# Patient Record
Sex: MALE | Race: WHITE | Employment: OTHER
[De-identification: names, ages, dates, MRNs, and addresses within clinical notes are randomized per-mention and may not be internally consistent; named-entity substitution may affect disease eponyms.]

---

## 2018-10-15 ENCOUNTER — HOSPITAL ENCOUNTER (OUTPATIENT)
Dept: GENERAL RADIOLOGY | Facility: CLINIC | Age: 83
Discharge: HOME | End: 2018-10-17
Payer: MEDICARE

## 2018-10-15 DIAGNOSIS — R04.2 HEMOPTYSIS: ICD-10-CM

## 2018-10-15 PROCEDURE — 71046 X-RAY EXAM CHEST 2 VIEWS: CPT

## 2019-01-28 RX ORDER — SODIUM CHLORIDE 9 MG/ML
INJECTION, SOLUTION INTRAVENOUS CONTINUOUS
Status: CANCELLED | OUTPATIENT
Start: 2019-01-28

## 2019-01-29 ENCOUNTER — HOSPITAL ENCOUNTER (OUTPATIENT)
Dept: CT IMAGING | Age: 84
Discharge: HOME | End: 2019-01-31
Payer: MEDICARE

## 2019-01-29 ENCOUNTER — HOSPITAL ENCOUNTER (OUTPATIENT)
Dept: GENERAL RADIOLOGY | Age: 84
Discharge: HOME | End: 2019-01-31
Payer: MEDICARE

## 2019-01-29 VITALS
SYSTOLIC BLOOD PRESSURE: 126 MMHG | HEART RATE: 63 BPM | HEIGHT: 71 IN | BODY MASS INDEX: 25.81 KG/M2 | WEIGHT: 184.4 LBS | TEMPERATURE: 97.2 F | DIASTOLIC BLOOD PRESSURE: 32 MMHG | OXYGEN SATURATION: 93 % | RESPIRATION RATE: 18 BRPM

## 2019-01-29 DIAGNOSIS — R91.8 MASS OF RIGHT LUNG: ICD-10-CM

## 2019-01-29 LAB
INR BLD: 1
PARTIAL THROMBOPLASTIN TIME: 28.5 SEC (ref 23–31)
PLATELET # BLD: 336 K/UL (ref 130–400)
PROTHROMBIN TIME: 10.8 SEC (ref 9.7–11.6)

## 2019-01-29 PROCEDURE — 77012 CT SCAN FOR NEEDLE BIOPSY: CPT

## 2019-01-29 PROCEDURE — 7100000011 HC PHASE II RECOVERY - ADDTL 15 MIN

## 2019-01-29 PROCEDURE — 85730 THROMBOPLASTIN TIME PARTIAL: CPT

## 2019-01-29 PROCEDURE — 7100000010 HC PHASE II RECOVERY - FIRST 15 MIN

## 2019-01-29 PROCEDURE — 32405 CT NEEDLE BIOPSY LUNG PERCUTANEOUS: CPT

## 2019-01-29 PROCEDURE — 88342 IMHCHEM/IMCYTCHM 1ST ANTB: CPT

## 2019-01-29 PROCEDURE — 85049 AUTOMATED PLATELET COUNT: CPT

## 2019-01-29 PROCEDURE — 88305 TISSUE EXAM BY PATHOLOGIST: CPT

## 2019-01-29 PROCEDURE — 88333 PATH CONSLTJ SURG CYTO XM 1: CPT

## 2019-01-29 PROCEDURE — 85610 PROTHROMBIN TIME: CPT

## 2019-01-29 PROCEDURE — 71045 X-RAY EXAM CHEST 1 VIEW: CPT

## 2019-01-29 PROCEDURE — 6360000002 HC RX W HCPCS: Performed by: RADIOLOGY

## 2019-01-29 PROCEDURE — 2580000003 HC RX 258: Performed by: RADIOLOGY

## 2019-01-29 PROCEDURE — 88341 IMHCHEM/IMCYTCHM EA ADD ANTB: CPT

## 2019-01-29 RX ORDER — FENTANYL CITRATE 50 UG/ML
INJECTION, SOLUTION INTRAMUSCULAR; INTRAVENOUS
Status: COMPLETED | OUTPATIENT
Start: 2019-01-29 | End: 2019-01-29

## 2019-01-29 RX ORDER — LISINOPRIL AND HYDROCHLOROTHIAZIDE 20; 12.5 MG/1; MG/1
1 TABLET ORAL DAILY
COMMUNITY

## 2019-01-29 RX ORDER — ACETAMINOPHEN 325 MG/1
650 TABLET ORAL EVERY 4 HOURS PRN
Status: DISCONTINUED | OUTPATIENT
Start: 2019-01-29 | End: 2019-02-01 | Stop reason: HOSPADM

## 2019-01-29 RX ORDER — MIDAZOLAM HYDROCHLORIDE 1 MG/ML
INJECTION INTRAMUSCULAR; INTRAVENOUS
Status: COMPLETED | OUTPATIENT
Start: 2019-01-29 | End: 2019-01-29

## 2019-01-29 RX ORDER — SODIUM CHLORIDE 9 MG/ML
INJECTION, SOLUTION INTRAVENOUS CONTINUOUS
Status: DISCONTINUED | OUTPATIENT
Start: 2019-01-29 | End: 2019-02-01 | Stop reason: HOSPADM

## 2019-01-29 RX ORDER — ATORVASTATIN CALCIUM 20 MG/1
20 TABLET, FILM COATED ORAL DAILY
COMMUNITY

## 2019-01-29 RX ORDER — GLIMEPIRIDE 4 MG/1
4 TABLET ORAL
COMMUNITY

## 2019-01-29 RX ADMIN — MIDAZOLAM 0.5 MG: 1 INJECTION INTRAMUSCULAR; INTRAVENOUS at 11:04

## 2019-01-29 RX ADMIN — FENTANYL CITRATE 50 MCG: 50 INJECTION, SOLUTION INTRAMUSCULAR; INTRAVENOUS at 10:58

## 2019-01-29 RX ADMIN — MIDAZOLAM 1 MG: 1 INJECTION INTRAMUSCULAR; INTRAVENOUS at 10:58

## 2019-01-29 RX ADMIN — SODIUM CHLORIDE: 9 INJECTION, SOLUTION INTRAVENOUS at 09:19

## 2019-01-29 ASSESSMENT — PAIN SCALES - GENERAL
PAINLEVEL_OUTOF10: 0

## 2019-01-29 ASSESSMENT — PAIN - FUNCTIONAL ASSESSMENT: PAIN_FUNCTIONAL_ASSESSMENT: 0-10

## 2019-01-30 LAB — SURGICAL PATHOLOGY REPORT: NORMAL

## 2019-02-04 ENCOUNTER — TELEPHONE (OUTPATIENT)
Dept: ONCOLOGY | Age: 84
End: 2019-02-04

## 2019-02-18 ENCOUNTER — TELEPHONE (OUTPATIENT)
Dept: RADIATION ONCOLOGY | Facility: MEDICAL CENTER | Age: 84
End: 2019-02-18

## 2019-02-21 ENCOUNTER — HOSPITAL ENCOUNTER (OUTPATIENT)
Dept: RADIATION ONCOLOGY | Facility: MEDICAL CENTER | Age: 84
Discharge: HOME | End: 2019-02-21
Payer: MEDICARE

## 2019-02-21 VITALS
RESPIRATION RATE: 14 BRPM | DIASTOLIC BLOOD PRESSURE: 70 MMHG | SYSTOLIC BLOOD PRESSURE: 168 MMHG | WEIGHT: 184 LBS | TEMPERATURE: 97.9 F | HEIGHT: 71 IN | OXYGEN SATURATION: 96 % | BODY MASS INDEX: 25.76 KG/M2 | HEART RATE: 89 BPM

## 2019-02-21 DIAGNOSIS — C34.11 CANCER OF UPPER LOBE OF RIGHT LUNG (HCC): ICD-10-CM

## 2019-02-21 PROCEDURE — 99213 OFFICE O/P EST LOW 20 MIN: CPT | Performed by: RADIOLOGY

## 2019-03-01 ENCOUNTER — HOSPITAL ENCOUNTER (OUTPATIENT)
Dept: RADIATION ONCOLOGY | Facility: MEDICAL CENTER | Age: 84
Discharge: HOME | End: 2019-03-01
Attending: RADIOLOGY
Payer: MEDICARE

## 2019-03-01 PROCEDURE — 77290 THER RAD SIMULAJ FIELD CPLX: CPT | Performed by: RADIOLOGY

## 2019-03-01 PROCEDURE — 77334 RADIATION TREATMENT AID(S): CPT | Performed by: RADIOLOGY

## 2019-03-01 PROCEDURE — 77470 SPECIAL RADIATION TREATMENT: CPT | Performed by: RADIOLOGY

## 2019-03-05 ENCOUNTER — HOSPITAL ENCOUNTER (OUTPATIENT)
Dept: RADIATION ONCOLOGY | Facility: MEDICAL CENTER | Age: 84
Discharge: HOME | End: 2019-03-05
Attending: RADIOLOGY
Payer: MEDICARE

## 2019-03-05 ENCOUNTER — TELEPHONE (OUTPATIENT)
Dept: ONCOLOGY | Age: 84
End: 2019-03-05

## 2019-03-05 PROCEDURE — 77295 3-D RADIOTHERAPY PLAN: CPT | Performed by: RADIOLOGY

## 2019-03-05 PROCEDURE — 77334 RADIATION TREATMENT AID(S): CPT | Performed by: RADIOLOGY

## 2019-03-05 PROCEDURE — 77300 RADIATION THERAPY DOSE PLAN: CPT | Performed by: RADIOLOGY

## 2019-03-13 ENCOUNTER — HOSPITAL ENCOUNTER (OUTPATIENT)
Dept: RADIATION ONCOLOGY | Facility: MEDICAL CENTER | Age: 84
Discharge: HOME | End: 2019-03-13
Attending: RADIOLOGY
Payer: MEDICARE

## 2019-03-13 PROCEDURE — 77387 GUIDANCE FOR RADJ TX DLVR: CPT | Performed by: RADIOLOGY

## 2019-03-13 PROCEDURE — 77280 THER RAD SIMULAJ FIELD SMPL: CPT | Performed by: RADIOLOGY

## 2019-03-13 PROCEDURE — 77412 RADIATION TX DELIVERY LVL 3: CPT | Performed by: RADIOLOGY

## 2019-03-14 ENCOUNTER — HOSPITAL ENCOUNTER (OUTPATIENT)
Dept: RADIATION ONCOLOGY | Facility: MEDICAL CENTER | Age: 84
Discharge: HOME | End: 2019-03-14
Attending: RADIOLOGY
Payer: MEDICARE

## 2019-03-14 PROCEDURE — 77387 GUIDANCE FOR RADJ TX DLVR: CPT | Performed by: RADIOLOGY

## 2019-03-14 PROCEDURE — 77412 RADIATION TX DELIVERY LVL 3: CPT | Performed by: RADIOLOGY

## 2019-03-15 ENCOUNTER — HOSPITAL ENCOUNTER (OUTPATIENT)
Dept: RADIATION ONCOLOGY | Facility: MEDICAL CENTER | Age: 84
Discharge: HOME | End: 2019-03-15
Attending: RADIOLOGY
Payer: MEDICARE

## 2019-03-15 LAB
BASOPHILS ABSOLUTE: NORMAL /ΜL
BASOPHILS RELATIVE PERCENT: NORMAL %
EOSINOPHILS ABSOLUTE: NORMAL /ΜL
EOSINOPHILS RELATIVE PERCENT: NORMAL %
HCT VFR BLD CALC: 42 % (ref 41–53)
HEMOGLOBIN: 14 G/DL (ref 13.5–17.5)
LYMPHOCYTES ABSOLUTE: NORMAL /ΜL
LYMPHOCYTES RELATIVE PERCENT: NORMAL %
MCH RBC QN AUTO: NORMAL PG
MCHC RBC AUTO-ENTMCNC: NORMAL G/DL
MCV RBC AUTO: NORMAL FL
MONOCYTES ABSOLUTE: NORMAL /ΜL
MONOCYTES RELATIVE PERCENT: NORMAL %
NEUTROPHILS ABSOLUTE: NORMAL /ΜL
NEUTROPHILS RELATIVE PERCENT: NORMAL %
PLATELET # BLD: 250 K/ΜL
PMV BLD AUTO: NORMAL FL
RBC # BLD: 4.37 10^6/ΜL
WBC # BLD: 7.6 10^3/ML

## 2019-03-15 PROCEDURE — 77412 RADIATION TX DELIVERY LVL 3: CPT | Performed by: RADIOLOGY

## 2019-03-15 PROCEDURE — 77387 GUIDANCE FOR RADJ TX DLVR: CPT | Performed by: RADIOLOGY

## 2019-03-18 ENCOUNTER — HOSPITAL ENCOUNTER (OUTPATIENT)
Dept: RADIATION ONCOLOGY | Facility: MEDICAL CENTER | Age: 84
Discharge: HOME | End: 2019-03-18
Attending: RADIOLOGY
Payer: MEDICARE

## 2019-03-18 VITALS
SYSTOLIC BLOOD PRESSURE: 169 MMHG | TEMPERATURE: 97.2 F | BODY MASS INDEX: 26.26 KG/M2 | OXYGEN SATURATION: 98 % | DIASTOLIC BLOOD PRESSURE: 73 MMHG | RESPIRATION RATE: 14 BRPM | HEART RATE: 90 BPM | WEIGHT: 188.25 LBS

## 2019-03-18 DIAGNOSIS — C34.11 CANCER OF UPPER LOBE OF RIGHT LUNG (HCC): Primary | ICD-10-CM

## 2019-03-18 PROCEDURE — 77412 RADIATION TX DELIVERY LVL 3: CPT | Performed by: RADIOLOGY

## 2019-03-18 PROCEDURE — 77417 THER RADIOLOGY PORT IMAGE(S): CPT | Performed by: RADIOLOGY

## 2019-03-18 PROCEDURE — 77387 GUIDANCE FOR RADJ TX DLVR: CPT | Performed by: RADIOLOGY

## 2019-03-19 ENCOUNTER — HOSPITAL ENCOUNTER (OUTPATIENT)
Dept: RADIATION ONCOLOGY | Facility: MEDICAL CENTER | Age: 84
Discharge: HOME | End: 2019-03-19
Attending: RADIOLOGY
Payer: MEDICARE

## 2019-03-19 PROCEDURE — 77387 GUIDANCE FOR RADJ TX DLVR: CPT | Performed by: RADIOLOGY

## 2019-03-19 PROCEDURE — 77412 RADIATION TX DELIVERY LVL 3: CPT | Performed by: RADIOLOGY

## 2019-03-19 PROCEDURE — 77336 RADIATION PHYSICS CONSULT: CPT | Performed by: RADIOLOGY

## 2019-03-20 ENCOUNTER — HOSPITAL ENCOUNTER (OUTPATIENT)
Dept: RADIATION ONCOLOGY | Facility: MEDICAL CENTER | Age: 84
Discharge: HOME | End: 2019-03-20
Attending: RADIOLOGY
Payer: MEDICARE

## 2019-03-20 PROCEDURE — 77387 GUIDANCE FOR RADJ TX DLVR: CPT | Performed by: RADIOLOGY

## 2019-03-20 PROCEDURE — 77417 THER RADIOLOGY PORT IMAGE(S): CPT | Performed by: RADIOLOGY

## 2019-03-20 PROCEDURE — 77412 RADIATION TX DELIVERY LVL 3: CPT | Performed by: RADIOLOGY

## 2019-03-21 ENCOUNTER — HOSPITAL ENCOUNTER (OUTPATIENT)
Dept: RADIATION ONCOLOGY | Facility: MEDICAL CENTER | Age: 84
Discharge: HOME | End: 2019-03-21
Attending: RADIOLOGY
Payer: MEDICARE

## 2019-03-21 PROCEDURE — 77412 RADIATION TX DELIVERY LVL 3: CPT | Performed by: RADIOLOGY

## 2019-03-21 PROCEDURE — 77387 GUIDANCE FOR RADJ TX DLVR: CPT | Performed by: RADIOLOGY

## 2019-03-22 ENCOUNTER — HOSPITAL ENCOUNTER (OUTPATIENT)
Dept: RADIATION ONCOLOGY | Facility: MEDICAL CENTER | Age: 84
Discharge: HOME | End: 2019-03-22
Attending: RADIOLOGY
Payer: MEDICARE

## 2019-03-22 PROCEDURE — 77387 GUIDANCE FOR RADJ TX DLVR: CPT | Performed by: RADIOLOGY

## 2019-03-22 PROCEDURE — 77412 RADIATION TX DELIVERY LVL 3: CPT | Performed by: RADIOLOGY

## 2019-03-25 ENCOUNTER — HOSPITAL ENCOUNTER (OUTPATIENT)
Dept: RADIATION ONCOLOGY | Facility: MEDICAL CENTER | Age: 84
Discharge: HOME | End: 2019-03-25
Attending: RADIOLOGY
Payer: MEDICARE

## 2019-03-25 VITALS
SYSTOLIC BLOOD PRESSURE: 168 MMHG | HEART RATE: 99 BPM | DIASTOLIC BLOOD PRESSURE: 72 MMHG | TEMPERATURE: 97.7 F | BODY MASS INDEX: 25.99 KG/M2 | RESPIRATION RATE: 16 BRPM | WEIGHT: 186.38 LBS | OXYGEN SATURATION: 98 %

## 2019-03-25 PROCEDURE — 77417 THER RADIOLOGY PORT IMAGE(S): CPT | Performed by: RADIOLOGY

## 2019-03-25 PROCEDURE — 77387 GUIDANCE FOR RADJ TX DLVR: CPT | Performed by: RADIOLOGY

## 2019-03-25 PROCEDURE — 77412 RADIATION TX DELIVERY LVL 3: CPT | Performed by: RADIOLOGY

## 2019-03-26 ENCOUNTER — HOSPITAL ENCOUNTER (OUTPATIENT)
Dept: RADIATION ONCOLOGY | Facility: MEDICAL CENTER | Age: 84
Discharge: HOME | End: 2019-03-26
Attending: RADIOLOGY
Payer: MEDICARE

## 2019-03-26 PROCEDURE — 77412 RADIATION TX DELIVERY LVL 3: CPT | Performed by: RADIOLOGY

## 2019-03-26 PROCEDURE — 77336 RADIATION PHYSICS CONSULT: CPT | Performed by: RADIOLOGY

## 2019-03-26 PROCEDURE — 77387 GUIDANCE FOR RADJ TX DLVR: CPT | Performed by: RADIOLOGY

## 2019-03-27 ENCOUNTER — HOSPITAL ENCOUNTER (OUTPATIENT)
Dept: RADIATION ONCOLOGY | Facility: MEDICAL CENTER | Age: 84
Discharge: HOME | End: 2019-03-27
Attending: RADIOLOGY
Payer: MEDICARE

## 2019-03-27 PROCEDURE — 77417 THER RADIOLOGY PORT IMAGE(S): CPT | Performed by: RADIOLOGY

## 2019-03-27 PROCEDURE — 77412 RADIATION TX DELIVERY LVL 3: CPT | Performed by: RADIOLOGY

## 2019-03-27 PROCEDURE — 77387 GUIDANCE FOR RADJ TX DLVR: CPT | Performed by: RADIOLOGY

## 2019-03-28 ENCOUNTER — HOSPITAL ENCOUNTER (OUTPATIENT)
Dept: RADIATION ONCOLOGY | Facility: MEDICAL CENTER | Age: 84
Discharge: HOME | End: 2019-03-28
Attending: RADIOLOGY
Payer: MEDICARE

## 2019-03-28 PROCEDURE — 77412 RADIATION TX DELIVERY LVL 3: CPT | Performed by: RADIOLOGY

## 2019-03-28 PROCEDURE — 77387 GUIDANCE FOR RADJ TX DLVR: CPT | Performed by: RADIOLOGY

## 2019-03-29 ENCOUNTER — HOSPITAL ENCOUNTER (OUTPATIENT)
Dept: RADIATION ONCOLOGY | Facility: MEDICAL CENTER | Age: 84
Discharge: HOME | End: 2019-03-29
Attending: RADIOLOGY
Payer: MEDICARE

## 2019-03-29 PROCEDURE — 77387 GUIDANCE FOR RADJ TX DLVR: CPT | Performed by: RADIOLOGY

## 2019-03-29 PROCEDURE — 77412 RADIATION TX DELIVERY LVL 3: CPT | Performed by: RADIOLOGY

## 2019-04-01 ENCOUNTER — HOSPITAL ENCOUNTER (OUTPATIENT)
Dept: RADIATION ONCOLOGY | Facility: MEDICAL CENTER | Age: 84
Discharge: HOME | End: 2019-04-01
Attending: RADIOLOGY
Payer: MEDICARE

## 2019-04-01 VITALS
DIASTOLIC BLOOD PRESSURE: 76 MMHG | RESPIRATION RATE: 16 BRPM | TEMPERATURE: 97.7 F | OXYGEN SATURATION: 100 % | WEIGHT: 188 LBS | SYSTOLIC BLOOD PRESSURE: 140 MMHG | HEART RATE: 101 BPM | BODY MASS INDEX: 26.22 KG/M2

## 2019-04-01 PROCEDURE — 77412 RADIATION TX DELIVERY LVL 3: CPT | Performed by: RADIOLOGY

## 2019-04-01 PROCEDURE — 77387 GUIDANCE FOR RADJ TX DLVR: CPT | Performed by: RADIOLOGY

## 2019-04-01 NOTE — PROGRESS NOTES
Ryania Cory  4/1/2019  Wt Readings from Last 3 Encounters:   04/01/19 188 lb (85.3 kg)   03/25/19 186 lb 6 oz (84.5 kg)   03/18/19 188 lb 4 oz (85.4 kg)     Body mass index is 26.22 kg/m². Treatment Area: Lung    Patient was seen today for weekly visit. Pt denies pain and reports that hes still spitting up blood with his cough. Dr. Lenka Christopher into eval. No changes made.     Comfort Alteration  Fatigue: Mild    Ventilation Alterations  Cough: Yes  Hemoptysis: Yes  Mucus Color: bloody  Dyspnea: No  O2 Sat: 100%    Nutritional Alteration  Anorexia: No  Nausea: No   Vomiting: No     Skin Alteration   Sensation:none    Radiation Dermatitis:  none    Mucous Membrane Alteration  Voice Changes/ Stridor/Larynx: no  Pharynx & Esophagus: none    Elimination Alterations  Constipation: no  Diarrhea:  no      Emotional  Coping: effective      Injury, potential bleeding or infection: none    Other:none    Lab Results   Component Value Date    WBC 7.60 03/15/2019     03/15/2019         BP (!) 140/76   Pulse 101   Temp 97.7 °F (36.5 °C) (Oral)   Resp 16   Wt 188 lb (85.3 kg)   SpO2 100%   BMI 26.22 kg/m²             Armando Trejo
Virus Vaccine 2018    Pneumococcal 13-valent Conjugate ([id number]) 2016       Social History     Tobacco Use   Smoking Status Former Smoker    Last attempt to quit: 1980    Years since quittin.2   Smokeless Tobacco Never Used     Counseling given: Not Answered      PHYSICAL FINDINGS:    CHAPERONE: Declined    ECO Asymptomatic      Vital Signs:  BP (!) 140/76   Pulse 101   Temp 97.7 °F (36.5 °C) (Oral)   Resp 16   Wt 188 lb (85.3 kg)   SpO2 100%   BMI 26.22 kg/m²   Wt Readings from Last 5 Encounters:   19 188 lb (85.3 kg)   19 186 lb 6 oz (84.5 kg)   19 188 lb 4 oz (85.4 kg)   19 184 lb (83.5 kg)   19 184 lb 6.4 oz (83.6 kg)     There is no skin irritation within the treatment fields, lungs are clear to auscultation, heart regular rate and rhythm, good skin turgor present, abdomen soft. ASSESSMENT PLAN:   Tolerating combined modality therapy well, continue as prescribed, [place] for potential toxicities including cytopenia, failure to thrive, mucositis, esophagitis. Electronically signed by Josefa Marinelli MD on 2019 at 10:15 [address] [facility] DrJulian    Drugs Prescribed:  New Prescriptions    No medications on file       Other Orders Placed:  No orders of the defined types were placed in this encounter.

## 2019-04-02 ENCOUNTER — HOSPITAL ENCOUNTER (OUTPATIENT)
Dept: RADIATION ONCOLOGY | Facility: MEDICAL CENTER | Age: 84
Discharge: HOME | End: 2019-04-02
Attending: RADIOLOGY
Payer: MEDICARE

## 2019-04-02 PROCEDURE — 77412 RADIATION TX DELIVERY LVL 3: CPT | Performed by: RADIOLOGY

## 2019-04-02 PROCEDURE — 77387 GUIDANCE FOR RADJ TX DLVR: CPT | Performed by: RADIOLOGY

## 2019-04-02 PROCEDURE — 77336 RADIATION PHYSICS CONSULT: CPT | Performed by: RADIOLOGY

## 2019-04-03 ENCOUNTER — HOSPITAL ENCOUNTER (OUTPATIENT)
Dept: RADIATION ONCOLOGY | Facility: MEDICAL CENTER | Age: 84
Discharge: HOME | End: 2019-04-03
Attending: RADIOLOGY
Payer: MEDICARE

## 2019-04-03 PROCEDURE — 77417 THER RADIOLOGY PORT IMAGE(S): CPT | Performed by: RADIOLOGY

## 2019-04-03 PROCEDURE — 77412 RADIATION TX DELIVERY LVL 3: CPT | Performed by: RADIOLOGY

## 2019-04-04 ENCOUNTER — HOSPITAL ENCOUNTER (OUTPATIENT)
Dept: RADIATION ONCOLOGY | Facility: MEDICAL CENTER | Age: 84
Discharge: HOME | End: 2019-04-04
Attending: RADIOLOGY
Payer: MEDICARE

## 2019-04-04 PROCEDURE — 77412 RADIATION TX DELIVERY LVL 3: CPT | Performed by: RADIOLOGY

## 2019-04-04 PROCEDURE — 77387 GUIDANCE FOR RADJ TX DLVR: CPT | Performed by: RADIOLOGY

## 2019-04-05 ENCOUNTER — APPOINTMENT (OUTPATIENT)
Dept: RADIATION ONCOLOGY | Facility: MEDICAL CENTER | Age: 84
End: 2019-04-05
Attending: RADIOLOGY
Payer: MEDICARE

## 2019-04-05 LAB
BASOPHILS ABSOLUTE: ABNORMAL /ΜL
BASOPHILS RELATIVE PERCENT: ABNORMAL %
EOSINOPHILS ABSOLUTE: ABNORMAL /ΜL
EOSINOPHILS RELATIVE PERCENT: ABNORMAL %
HCT VFR BLD CALC: 37.6 % (ref 41–53)
HEMOGLOBIN: 12.8 G/DL (ref 13.5–17.5)
LYMPHOCYTES ABSOLUTE: ABNORMAL /ΜL
LYMPHOCYTES RELATIVE PERCENT: ABNORMAL %
MCH RBC QN AUTO: ABNORMAL PG
MCHC RBC AUTO-ENTMCNC: ABNORMAL G/DL
MCV RBC AUTO: ABNORMAL FL
MONOCYTES ABSOLUTE: ABNORMAL /ΜL
MONOCYTES RELATIVE PERCENT: ABNORMAL %
NEUTROPHILS ABSOLUTE: ABNORMAL /ΜL
NEUTROPHILS RELATIVE PERCENT: ABNORMAL %
PLATELET # BLD: 246 K/ΜL
PMV BLD AUTO: ABNORMAL FL
RBC # BLD: 4.02 10^6/ΜL
WBC # BLD: 3.97 10^3/ML

## 2019-04-05 PROCEDURE — 77412 RADIATION TX DELIVERY LVL 3: CPT | Performed by: RADIOLOGY

## 2019-04-05 PROCEDURE — 77387 GUIDANCE FOR RADJ TX DLVR: CPT | Performed by: RADIOLOGY

## 2019-04-08 ENCOUNTER — HOSPITAL ENCOUNTER (OUTPATIENT)
Dept: RADIATION ONCOLOGY | Facility: MEDICAL CENTER | Age: 84
Discharge: HOME | End: 2019-04-08
Attending: RADIOLOGY
Payer: MEDICARE

## 2019-04-08 VITALS
TEMPERATURE: 97.3 F | HEART RATE: 94 BPM | OXYGEN SATURATION: 99 % | SYSTOLIC BLOOD PRESSURE: 164 MMHG | DIASTOLIC BLOOD PRESSURE: 84 MMHG | BODY MASS INDEX: 25.87 KG/M2 | WEIGHT: 185.5 LBS | RESPIRATION RATE: 18 BRPM

## 2019-04-08 PROCEDURE — 77412 RADIATION TX DELIVERY LVL 3: CPT | Performed by: RADIOLOGY

## 2019-04-08 PROCEDURE — 77387 GUIDANCE FOR RADJ TX DLVR: CPT | Performed by: RADIOLOGY

## 2019-04-08 NOTE — PROGRESS NOTES
Macario Form  4/8/2019  Wt Readings from Last 3 Encounters:   04/08/19 185 lb 8 oz (84.1 kg)   04/01/19 188 lb (85.3 kg)   03/25/19 186 lb 6 oz (84.5 kg)     Body mass index is 25.87 kg/m². Treatment Area: Lung    Patient was seen today for weekly visit. Pt denies pain. Pt reports chronic blood in sputum when he coughs. Pt states he blew his nose this am and there was blood.  Dr. Marguerite Baltazar into eval.    Comfort Alteration  Fatigue: Mild    Ventilation Alterations  Cough: Yes  Hemoptysis: Yes  Mucus Color: bloody  Dyspnea: No  O2 Sat: 99%    Nutritional Alteration  Anorexia: No  Nausea: No   Vomiting: No     Skin Alteration   Sensation:none    Radiation Dermatitis:  none    Mucous Membrane Alteration  Voice Changes/ Stridor/Larynx: no  Pharynx & Esophagus: none    Elimination Alterations  Constipation: no  Diarrhea:  no      Emotional  Coping: effective      Injury, potential bleeding or infection: none    Other:none    Lab Results   Component Value Date    WBC 7.60 03/15/2019     03/15/2019         BP (!) 164/84   Pulse 94   Temp 97.3 °F (36.3 °C) (Oral)   Resp 18   Wt 185 lb 8 oz (84.1 kg)   SpO2 99%   BMI 25.87 kg/m²             Zane Weir
Immunizations/Smoking Status:  Immunization History   Administered Date(s) Administered    Influenza Virus Vaccine 2018    Pneumococcal 13-valent Conjugate ([id number]) 2016       Social History     Tobacco Use   Smoking Status Former Smoker    Last attempt to quit: 1980    Years since quittin.2   Smokeless Tobacco Never Used     Counseling given: Not Answered      PHYSICAL FINDINGS:    CHAPERONE: Declined    ECO Asymptomatic      Vital Signs:  BP (!) 164/84   Pulse 94   Temp 97.3 °F (36.3 °C) (Oral)   Resp 18   Wt 185 lb 8 oz (84.1 kg)   SpO2 99%   BMI 25.87 kg/m²   Wt Readings from Last 5 Encounters:   19 185 lb 8 oz (84.1 kg)   19 188 lb (85.3 kg)   19 186 lb 6 oz (84.5 kg)   19 188 lb 4 oz (85.4 kg)   19 184 lb (83.5 kg)     Faint erythema outlines the treatment portals on his back, oral mucosa is pink and moist, no visible mucositis or thrush. There is sllight inflammation but no visible infection, dried blood at his right nostril, the left nostril is clear. Lungs are clear to auscultation, heart regular rate and rhythm, good skin turgor is present. Laboratory studies from [facility] clinic are within acceptable range to continue therapy as planned    ASSESSMENT PLAN:   Tolerating combined modality treatment fairly well. We will continue to monitor for the potential acute toxicities including cytopenia, failure to thrive, mucositis. Continue treatment as prescribed. He will use saline nasal spray to moisten the tissue of his nostril and vaseline gently applied by Q-tip. Electronically signed by Martina Costello MD on 2019 at 10:22 [facility]    Drugs Prescribed:  New Prescriptions    No medications on file       Other Orders Placed:  No orders of the defined types were placed in this encounter.

## 2019-04-09 ENCOUNTER — HOSPITAL ENCOUNTER (OUTPATIENT)
Dept: RADIATION ONCOLOGY | Facility: MEDICAL CENTER | Age: 84
Discharge: HOME | End: 2019-04-09
Attending: RADIOLOGY
Payer: MEDICARE

## 2019-04-09 PROCEDURE — 77412 RADIATION TX DELIVERY LVL 3: CPT | Performed by: RADIOLOGY

## 2019-04-09 PROCEDURE — 77336 RADIATION PHYSICS CONSULT: CPT | Performed by: RADIOLOGY

## 2019-04-10 ENCOUNTER — HOSPITAL ENCOUNTER (OUTPATIENT)
Dept: RADIATION ONCOLOGY | Facility: MEDICAL CENTER | Age: 84
Discharge: HOME | End: 2019-04-10
Attending: RADIOLOGY
Payer: MEDICARE

## 2019-04-10 PROCEDURE — 77412 RADIATION TX DELIVERY LVL 3: CPT | Performed by: RADIOLOGY

## 2019-04-10 PROCEDURE — 77417 THER RADIOLOGY PORT IMAGE(S): CPT | Performed by: RADIOLOGY

## 2019-04-11 ENCOUNTER — HOSPITAL ENCOUNTER (OUTPATIENT)
Dept: RADIATION ONCOLOGY | Facility: MEDICAL CENTER | Age: 84
Discharge: HOME | End: 2019-04-11
Attending: RADIOLOGY
Payer: MEDICARE

## 2019-04-11 PROCEDURE — 77412 RADIATION TX DELIVERY LVL 3: CPT | Performed by: RADIOLOGY

## 2019-04-11 PROCEDURE — 77387 GUIDANCE FOR RADJ TX DLVR: CPT | Performed by: RADIOLOGY

## 2019-04-12 ENCOUNTER — HOSPITAL ENCOUNTER (OUTPATIENT)
Dept: RADIATION ONCOLOGY | Facility: MEDICAL CENTER | Age: 84
Discharge: HOME | End: 2019-04-12
Attending: RADIOLOGY
Payer: MEDICARE

## 2019-04-12 PROCEDURE — 77387 GUIDANCE FOR RADJ TX DLVR: CPT | Performed by: RADIOLOGY

## 2019-04-12 PROCEDURE — 77412 RADIATION TX DELIVERY LVL 3: CPT | Performed by: RADIOLOGY

## 2019-04-15 ENCOUNTER — HOSPITAL ENCOUNTER (OUTPATIENT)
Dept: RADIATION ONCOLOGY | Facility: MEDICAL CENTER | Age: 84
Discharge: HOME | End: 2019-04-15
Attending: RADIOLOGY
Payer: MEDICARE

## 2019-04-15 VITALS
TEMPERATURE: 97.5 F | HEART RATE: 100 BPM | DIASTOLIC BLOOD PRESSURE: 73 MMHG | RESPIRATION RATE: 16 BRPM | SYSTOLIC BLOOD PRESSURE: 157 MMHG | WEIGHT: 187 LBS | BODY MASS INDEX: 26.08 KG/M2 | OXYGEN SATURATION: 98 %

## 2019-04-15 DIAGNOSIS — C34.11 CANCER OF UPPER LOBE OF RIGHT LUNG (HCC): Primary | ICD-10-CM

## 2019-04-15 PROCEDURE — 77387 GUIDANCE FOR RADJ TX DLVR: CPT | Performed by: RADIOLOGY

## 2019-04-15 PROCEDURE — 77412 RADIATION TX DELIVERY LVL 3: CPT | Performed by: RADIOLOGY

## 2019-04-15 NOTE — PROGRESS NOTES
Date of Service: 4/15/2019      Location:  [facility] Radiation Oncology,   [address], [place], [address]   [phone]     RADIATION ONCOLOGY WEEKLY PROGRESS NOTE    Patient ID:   Yung Bautista  : 1935   MRN: [id number]    Diagnosis:  Cancer Staging  Cancer of upper lobe of right lung [facility])  Staging form: Lung, AJCC 8th Edition  - Clinical stage from 2019: Stage IIIA (cT3, cN1, cM0) - Signed by Josefa Marinelli MD on 2019      Radiation Treatment Information:   Actual Dose: 4800 cGy  Total Planned Dose: 6000 cGy  Treatment Site: Right upper lobe tumor/hilar nodes    Chemotherapy update: The patient is receiving concurrent carboplatin and Taxol per Dr. Valentina Drummond. Therapy imaging monitoring: Image match with port films    SUBJECTIVE: The patient is doing well. He denies dysphagia, odynophagia, shortness of breath, cough, or chest pain. He presented with hemoptysis. He notes that this has decreased significantly. He is able to perform his activities of daily living. He denies pain anywhere. He denies irregular heart rate.     OBJECTIVE:     Labs:  WBC   Date Value Ref Range Status   2019 3.97 10^3/mL      Hemoglobin   Date Value Ref Range Status   2019 12.8 (A) 13.5 - 17.5 g/dL      Platelets   Date Value Ref Range Status   2019 246 K/µL    No results found for: PSA  Medications:    Current Outpatient Medications:     aspirin 81 MG tablet, Take 81 mg by mouth daily, Disp: , Rfl:     atorvastatin (LIPITOR) 20 MG tablet, Take 20 mg by mouth daily, Disp: , Rfl:     glimepiride (AMARYL) 4 MG tablet, Take 4 mg by mouth every morning (before breakfast), Disp: , Rfl:     lisinopril-hydrochlorothiazide (PRINZIDE;ZESTORETIC) 20-12.5 MG per tablet, Take 1 tablet by mouth daily, Disp: , Rfl:     Pain Plan:  none    Pain score: 0      Immunizations/Smoking Status:  Immunization History   Administered Date(s) Administered    Influenza Virus Vaccine 2018    Pneumococcal 13-valent Conjugate ([id number]) 2016       Social History     Tobacco Use   Smoking Status Former Smoker    Last attempt to quit: 1980    Years since quittin.3   Smokeless Tobacco Never Used     Counseling given: Not Answered      PHYSICAL FINDINGS:    CHAPERONE: Not Required    ECO Asymptomatic    Vital Signs:  BP (!) 157/73   Pulse 100   Temp 97.5 °F (36.4 °C) (Oral)   Resp 16   Wt 187 lb (84.8 kg)   SpO2 98%   BMI 26.08 kg/m²   Wt Readings from Last 5 Encounters:   04/15/19 187 lb (84.8 kg)   19 185 lb 8 oz (84.1 kg)   19 188 lb (85.3 kg)   19 186 lb 6 oz (84.5 kg)   19 188 lb 4 oz (85.4 kg)     Heart sounds are normal.  The lungs are clear to auscultation. The skin is intact. Visual examination of the oral cavity and oropharynx is unremarkable. The abdomen is soft and nontender. No peripheral edema is noted. ASSESSMENT PLAN: The patient will continue with radiation therapy. Overall, he is doing well. Electronically signed by Renetta Garcia MD on 4/15/2019 at 10:29 [address] [facility] DrJulian    Drugs Prescribed:  New Prescriptions    No medications on file       Other Orders Placed:  No orders of the defined types were placed in this encounter.

## 2019-04-15 NOTE — PROGRESS NOTES
Nash Ojeda  4/15/2019  Wt Readings from Last 3 Encounters:   04/15/19 187 lb (84.8 kg)   04/08/19 185 lb 8 oz (84.1 kg)   04/01/19 188 lb (85.3 kg)     Body mass index is 26.08 kg/m². Treatment Area:Lung    Patient was seen today for weekly visit. Pt denies pain. Pt does report nose bleeds after chemo.  Dr. Cathe Cogan into eval.      Comfort Alteration  Fatigue: Mild    Ventilation Alterations  Cough: No  Hemoptysis: Yes  Mucus Color: clear with slight blood  Dyspnea: No  O2 Sat: 98%    Nutritional Alteration  Anorexia: No  Nausea: No   Vomiting: No     Skin Alteration   Sensation:none    Radiation Dermatitis:  none    Mucous Membrane Alteration  Voice Changes/ Stridor/Larynx: no  Pharynx & Esophagus: none    Elimination Alterations  Constipation: no  Diarrhea:  no      Emotional  Coping: effective      Injury, potential bleeding or infection: none    Other:none    Lab Results   Component Value Date    WBC 3.97 04/05/2019     04/05/2019         BP (!) 157/73   Pulse 100   Temp 97.5 °F (36.4 °C) (Oral)   Resp 16   Wt 187 lb (84.8 kg)   SpO2 98%   BMI 26.08 kg/m²             Geralene Confer

## 2019-04-16 ENCOUNTER — HOSPITAL ENCOUNTER (OUTPATIENT)
Dept: RADIATION ONCOLOGY | Facility: MEDICAL CENTER | Age: 84
Discharge: HOME | End: 2019-04-16
Attending: RADIOLOGY
Payer: MEDICARE

## 2019-04-16 PROCEDURE — 77412 RADIATION TX DELIVERY LVL 3: CPT | Performed by: RADIOLOGY

## 2019-04-16 PROCEDURE — 77387 GUIDANCE FOR RADJ TX DLVR: CPT | Performed by: RADIOLOGY

## 2019-04-16 PROCEDURE — 77336 RADIATION PHYSICS CONSULT: CPT | Performed by: RADIOLOGY

## 2019-04-17 ENCOUNTER — HOSPITAL ENCOUNTER (OUTPATIENT)
Dept: RADIATION ONCOLOGY | Facility: MEDICAL CENTER | Age: 84
Discharge: HOME | End: 2019-04-17
Attending: RADIOLOGY
Payer: MEDICARE

## 2019-04-17 PROCEDURE — 77412 RADIATION TX DELIVERY LVL 3: CPT | Performed by: RADIOLOGY

## 2019-04-17 PROCEDURE — 77417 THER RADIOLOGY PORT IMAGE(S): CPT | Performed by: RADIOLOGY

## 2019-04-18 ENCOUNTER — HOSPITAL ENCOUNTER (OUTPATIENT)
Dept: RADIATION ONCOLOGY | Facility: MEDICAL CENTER | Age: 84
Discharge: HOME | End: 2019-04-18
Attending: RADIOLOGY
Payer: MEDICARE

## 2019-04-18 PROCEDURE — 77387 GUIDANCE FOR RADJ TX DLVR: CPT | Performed by: RADIOLOGY

## 2019-04-18 PROCEDURE — 77412 RADIATION TX DELIVERY LVL 3: CPT | Performed by: RADIOLOGY

## 2019-04-19 ENCOUNTER — HOSPITAL ENCOUNTER (OUTPATIENT)
Dept: RADIATION ONCOLOGY | Facility: MEDICAL CENTER | Age: 84
Discharge: HOME | End: 2019-04-19
Attending: RADIOLOGY
Payer: MEDICARE

## 2019-04-19 PROCEDURE — 77412 RADIATION TX DELIVERY LVL 3: CPT | Performed by: RADIOLOGY

## 2019-04-19 PROCEDURE — 77387 GUIDANCE FOR RADJ TX DLVR: CPT | Performed by: RADIOLOGY

## 2019-04-22 ENCOUNTER — HOSPITAL ENCOUNTER (OUTPATIENT)
Dept: RADIATION ONCOLOGY | Facility: MEDICAL CENTER | Age: 84
Discharge: HOME | End: 2019-04-22
Attending: RADIOLOGY
Payer: MEDICARE

## 2019-04-22 VITALS
TEMPERATURE: 97.3 F | OXYGEN SATURATION: 98 % | DIASTOLIC BLOOD PRESSURE: 67 MMHG | RESPIRATION RATE: 14 BRPM | SYSTOLIC BLOOD PRESSURE: 149 MMHG | WEIGHT: 186.13 LBS | BODY MASS INDEX: 25.96 KG/M2 | HEART RATE: 110 BPM

## 2019-04-22 PROCEDURE — 77387 GUIDANCE FOR RADJ TX DLVR: CPT | Performed by: RADIOLOGY

## 2019-04-22 PROCEDURE — 77412 RADIATION TX DELIVERY LVL 3: CPT | Performed by: RADIOLOGY

## 2019-04-22 NOTE — PROGRESS NOTES
Julian Hackett M.D. Génesis Robertson. Scott Godfrey, Ph.D., M.D., Lopez Willis M.D. Mary Henderson, Ph.D., M.RICARDO. Clevester Kussmaul, M.D. Date of Service: 2019    Location:  [facility] Radiation Oncology,   [address], [place], [address]   [phone]     RADIATION ONCOLOGY WEEKLY PROGRESS NOTE    Patient ID:   Sabiha Bradshaw  : 1935   MRN: [id number]    Diagnosis:  Cancer Staging  Cancer of upper lobe of right lung [facility])  Staging form: Lung, AJCC 8th Edition  - Clinical stage from 2019: Stage IIIA (cT3, cN1, cM0) - Signed by Ambar Snyder MD on 2019      Radiation Treatment Information:   Actual Dose: 5,800 cGy  Total Planned Dose:  6,000 cGy  Treatment Site: right upper lobe tumor/regional nodes    IMAGING:   Image match with port films    CHEMOTHERAPY UPDATE:   Carboplatin and taxol      SUBJECTIVE: Sabiha Bradshaw completes his plan course of external beam radiation tomorrow having tolerated it remarkably well. He has mild fatigue developing over the past week but is able to continue his activities of daily living. No skin irritation, chest pain or discomfort, nor difficulty swallowing. He notes his hemoptysis has dramatically improved. He has occasional epistaxis though this is also reduced using saline nasal sprays and Vaseline.     OBJECTIVE:     Labs:  WBC   Date Value Ref Range Status   2019 3.97 10^3/mL      Hemoglobin   Date Value Ref Range Status   2019 12.8 (A) 13.5 - 17.5 g/dL      Platelets   Date Value Ref Range Status   2019 246 K/µL    Medications:    Current Outpatient Medications:     aspirin 81 MG tablet, Take 81 mg by mouth daily, Disp: , Rfl:     atorvastatin (LIPITOR) 20 MG tablet, Take 20 mg by mouth daily, Disp: , Rfl:     glimepiride (AMARYL) 4 MG tablet, Take 4 mg by mouth every morning (before breakfast), Disp: , Rfl:     lisinopril-hydrochlorothiazide (PRINZIDE;ZESTORETIC) 20-12.5 MG per tablet, Take 1 tablet by mouth daily, Disp: , Rfl:     Pain Plan:  None needed                 Immunizations/Smoking Status:  Immunization History   Administered Date(s) Administered    Influenza Virus Vaccine 2018    Pneumococcal 13-valent Conjugate ([id number]) 2016       Social History     Tobacco Use   Smoking Status Former Smoker    Last attempt to quit: 1980    Years since quittin.3   Smokeless Tobacco Never Used     Counseling given: Not Answered      PHYSICAL FINDINGS:    CHAPERONE: Declined    ECO Asymptomatic      Vital Signs: There were no vitals taken for this visit. Wt Readings from Last 5 Encounters:   19 186 lb 2 oz (84.4 kg)   04/15/19 187 lb (84.8 kg)   19 185 lb 8 oz (84.1 kg)   19 188 lb (85.3 kg)   19 186 lb 6 oz (84.5 kg)     There is no overt skin irritation within the radiation treatment portals, his lungs are clear to auscultation, heart regular rate and rhythm, oral mucosa is pink and moist.  The nares are clear with minimal inflammation and no visible blood. ASSESSMENT PLAN:   Tolerated combined modality therapy quite well. Follow-up instructions were described to him including follow-up in 4 months time or lytic with 1 month telephone contact per nursing. He relates restaging of his chest by CT is due in 3-4 weeks time through Dr. Guille Torres he will likely proceed on to immunotherapy. Electronically signed by Alicia Mckeon MD on 2019 at 10:22 [facility]     Drugs Prescribed:  New Prescriptions    No medications on file       Other Orders Placed:  No orders of the defined types were placed in this encounter.

## 2019-04-22 NOTE — PROGRESS NOTES
Baudilio Barr  4/22/2019  Wt Readings from Last 3 Encounters:   04/22/19 186 lb 2 oz (84.4 kg)   04/15/19 187 lb (84.8 kg)   04/08/19 185 lb 8 oz (84.1 kg)     Body mass index is 25.96 kg/m². Treatment Area: Lung    Patient was seen today for weekly visit. Pt denies pain but reports bloody noses. Dr. Paula Vee into eval. No changes made    Comfort Alteration  Fatigue:  Moderate    Ventilation Alterations  Cough: Yes  Hemoptysis: No  Mucus Color: clear  Dyspnea: No  O2 Sat: 98%    Nutritional Alteration  Anorexia: No  Nausea: No   Vomiting: No     Skin Alteration   Sensation:none    Radiation Dermatitis:  none    Mucous Membrane Alteration  Voice Changes/ Stridor/Larynx: no  Pharynx & Esophagus: none    Elimination Alterations  Constipation: no  Diarrhea:  no      Emotional  Coping: effective      Injury, potential bleeding or infection: no    Other:no    Lab Results   Component Value Date    WBC 3.97 04/05/2019     04/05/2019         BP (!) 149/67   Pulse 110   Temp 97.3 °F (36.3 °C) (Oral)   Resp 14   Wt 186 lb 2 oz (84.4 kg)   SpO2 98%   BMI 25.96 kg/m²             Miladys Gonzalez

## 2019-04-23 ENCOUNTER — APPOINTMENT (OUTPATIENT)
Dept: RADIATION ONCOLOGY | Facility: MEDICAL CENTER | Age: 84
End: 2019-04-23
Attending: RADIOLOGY
Payer: MEDICARE

## 2019-04-23 PROCEDURE — 77336 RADIATION PHYSICS CONSULT: CPT | Performed by: RADIOLOGY

## 2019-04-23 PROCEDURE — 77387 GUIDANCE FOR RADJ TX DLVR: CPT | Performed by: RADIOLOGY

## 2019-04-23 PROCEDURE — 77412 RADIATION TX DELIVERY LVL 3: CPT | Performed by: RADIOLOGY

## 2019-07-26 ENCOUNTER — HOSPITAL ENCOUNTER (OUTPATIENT)
Dept: INTERVENTIONAL RADIOLOGY/VASCULAR | Age: 84
Discharge: HOME | End: 2019-07-28
Payer: MEDICARE

## 2019-07-26 DIAGNOSIS — C34.81 MALIGNANT NEOPLASM OF OVERLAPPING SITES OF RIGHT BRONCHUS AND LUNG (HCC): ICD-10-CM

## 2019-07-26 PROCEDURE — 36598 INJ W/FLUOR EVAL CV DEVICE: CPT

## 2019-07-26 PROCEDURE — 6360000004 HC RX CONTRAST MEDICATION: Performed by: RADIOLOGY

## 2019-07-26 PROCEDURE — 6360000002 HC RX W HCPCS: Performed by: RADIOLOGY

## 2019-07-26 RX ADMIN — Medication 500 UNITS: at 09:29

## 2019-07-26 RX ADMIN — IOPAMIDOL 10 ML: 612 INJECTION, SOLUTION INTRAVENOUS at 09:51

## 2019-08-22 LAB
ALBUMIN SERPL-MCNC: ABNORMAL G/DL
ALP BLD-CCNC: ABNORMAL U/L
ALT SERPL-CCNC: 30 U/L
ANION GAP SERPL CALCULATED.3IONS-SCNC: ABNORMAL MMOL/L
AST SERPL-CCNC: 27 U/L
BASOPHILS ABSOLUTE: ABNORMAL /ΜL
BASOPHILS RELATIVE PERCENT: ABNORMAL %
BILIRUB SERPL-MCNC: ABNORMAL MG/DL (ref 0.1–1.4)
BUN BLDV-MCNC: 15 MG/DL
CALCIUM SERPL-MCNC: 9 MG/DL
CHLORIDE BLD-SCNC: 105 MMOL/L
CO2: ABNORMAL MMOL/L
CREAT SERPL-MCNC: 0.78 MG/DL
EOSINOPHILS ABSOLUTE: ABNORMAL /ΜL
EOSINOPHILS RELATIVE PERCENT: ABNORMAL %
GFR CALCULATED: ABNORMAL
GLUCOSE BLD-MCNC: 142 MG/DL
HCT VFR BLD CALC: 39.3 % (ref 41–53)
HEMOGLOBIN: 13.2 G/DL (ref 13.5–17.5)
LYMPHOCYTES ABSOLUTE: ABNORMAL /ΜL
LYMPHOCYTES RELATIVE PERCENT: ABNORMAL %
MCH RBC QN AUTO: ABNORMAL PG
MCHC RBC AUTO-ENTMCNC: ABNORMAL G/DL
MCV RBC AUTO: ABNORMAL FL
MONOCYTES ABSOLUTE: ABNORMAL /ΜL
MONOCYTES RELATIVE PERCENT: ABNORMAL %
NEUTROPHILS ABSOLUTE: ABNORMAL /ΜL
NEUTROPHILS RELATIVE PERCENT: ABNORMAL %
PLATELET # BLD: 239 K/ΜL
PMV BLD AUTO: ABNORMAL FL
POTASSIUM SERPL-SCNC: 4.5 MMOL/L
RBC # BLD: 4.31 10^6/ΜL
SODIUM BLD-SCNC: 140 MMOL/L
TOTAL PROTEIN: ABNORMAL
WBC # BLD: 6.88 10^3/ML

## 2019-08-30 ENCOUNTER — HOSPITAL ENCOUNTER (OUTPATIENT)
Dept: RADIATION ONCOLOGY | Facility: MEDICAL CENTER | Age: 84
Discharge: HOME | End: 2019-08-30
Attending: RADIOLOGY
Payer: MEDICARE

## 2019-08-30 VITALS
TEMPERATURE: 97.3 F | WEIGHT: 175.5 LBS | BODY MASS INDEX: 24.48 KG/M2 | RESPIRATION RATE: 16 BRPM | SYSTOLIC BLOOD PRESSURE: 117 MMHG | HEART RATE: 78 BPM | DIASTOLIC BLOOD PRESSURE: 62 MMHG | OXYGEN SATURATION: 100 %

## 2019-08-30 PROCEDURE — 99212 OFFICE O/P EST SF 10 MIN: CPT | Performed by: RADIOLOGY

## 2019-08-30 NOTE — PROGRESS NOTES
Ezequiel Hill M.D. Chetna Liriano. Meena Jackson, Ph.D., M.D., Toney Roe M.D. Federico Romo, Ph.D., M.RICARDO. Anette Paris M.D. Date of Service: 2019    Location:  [facility] Radiation On[facility],   [address], [place], [address]   [address]    Patient ID:   Rukhsana Carbone  : 1935   MRN: [id number]    DIAGNOSIS:  Cancer Staging  Cancer of upper lobe of right lung [facility])  Staging form: Lung, AJCC 8th Edition  - Clinical stage from 2019: Stage IIIA (cT3, cN1, cM0) - Signed by Basilio Callahan MD on 2019      INTERVAL HISTORY:   Rukhsana Carbone is a 82 y.o. male combined modality therapy for locally advanced non-small cell lung carcinoma. He was not felt to be a surgical candidate and received a total dose delivery of 6000 cGy by 2019. Current chemotherapy with carboplatin and Taxol was provided by medical oncology with whom he has been in follow-up for immunotherapy. Mr. Yara Torres appears to be doing well.   MEDICATIONS:    Current Outpatient Medications:     aspirin 81 MG tablet, Take 81 mg by mouth daily, Disp: , Rfl:     atorvastatin (LIPITOR) 20 MG tablet, Take 20 mg by mouth daily, Disp: , Rfl:     glimepiride (AMARYL) 4 MG tablet, Take 4 mg by mouth every morning (before breakfast), Disp: , Rfl:     lisinopril-hydrochlorothiazide (PRINZIDE;ZESTORETIC) 20-12.5 MG per tablet, Take 1 tablet by mouth daily, Disp: , Rfl:     ALLERGIES:  No Known Allergies    IMMUNIZATIONS:  Immunization History   Administered Date(s) Administered    Influenza Virus Vaccine 2018    Pneumococcal Conjugate 13-valent ([id number]) 2016       SMOKING:  Social History     Tobacco Use   Smoking Status Former Smoker    Last attempt to quit: 1980    Years since quittin.6   Smokeless Tobacco Never Used     Counseling given: Not Answered      Labs:  WBC   Date Value Ref Range Status   2019 3.97 10^3/mL      Hemoglobin   Date Value Ref Range Status   2019 12.8 (A) 13.5 - 17.5 g/dL      Platelets   Date Value Ref Range Status   2019 246 K/µL      REVIEW OF SYSTEMS:    Review of Systems at this time are notable for further hemoptysis no progressive shortness of breath chest pain. He has a morning cough producing clear sputum and relates having lost hair on his scalp while on chemoradiation, secondary to the chemotherapy. Overall he has a general sense of well-being. The remaining oncologic review of systems was noted and entered into the medical record after my review. He incidentally notes having transient hyperglycemia, resolved by his report, under management by his primary physicians. PHYSICAL EXAMINATION:  CHAPERONE: Declined  ECO Asymptomatic  VITAL SIGNS: /62   Pulse 78   Temp 97.3 °F (36.3 °C) (Oral)   Resp 16   Wt 175 lb 8 oz (79.6 kg)   SpO2 100%   BMI 24.48 kg/m²   Wt Readings from Last 3 Encounters:   19 175 lb 8 oz (79.6 kg)   19 186 lb 2 oz (84.4 kg)   04/15/19 187 lb (84.8 kg)     Physical Exam shows him to be alert and oriented with good insight into the status of his disease process. No lymphadenopathy is felt about the neck neither cervical, supraclavicular, infraclavicular nor axillary regions. His lungs are clear to auscultation, heart regular rate and rhythm, good skin turgor is present. Oral mucosa is pink and moist without visible lesion or infection. No residual skin irritation noted over the prior treatment portals, no tenderness over the spine. He ambulates without unilateral weakness. ASSESSMENT AND PLAN:  He appears to be recovering satisfactorily from chemoradiation now 4 months out of treatment. He is seeing Dr. Geetha Echevarria in October, with anticipation of restaging imaging scans including PET/CT. I offered follow-up in our clinic in 6 months or earlier should new problems develop.     Electronically signed by Arianna Booker

## 2019-08-30 NOTE — PROGRESS NOTES
Ferny Eaton  8/30/2019  7:57 AM    Pt here for follow up. Patient denies pain. No SOB. Cough, productive first thing in the am fleghm. Currently on immunotherapy in his port twice monthly. Dr. Jewels Pompa to [facility].  Vitals:    08/30/19 0755   BP: 117/62   Pulse: 78   Resp: 16   Temp: 97.3 °F (36.3 °C)   SpO2: 100%    : Patient Currently in Pain: No             Wt Readings from Last 1 Encounters:   08/30/19 175 lb 8 oz (79.6 kg)                Current Outpatient Medications:     aspirin 81 MG tablet, Take 81 mg by mouth daily, Disp: , Rfl:     atorvastatin (LIPITOR) 20 MG tablet, Take 20 mg by mouth daily, Disp: , Rfl:     glimepiride (AMARYL) 4 MG tablet, Take 4 mg by mouth every morning (before breakfast), Disp: , Rfl:     lisinopril-hydrochlorothiazide (PRINZIDE;ZESTORETIC) 20-12.5 MG per tablet, Take 1 tablet by mouth daily, Disp: , Rfl:     Immunizations:    Influenza status:    [x]   Current   []   Patient declined    Pneumococcal status:  [x]   Current  []   Patient declined    Smoking Status:    [] Smoker - PPD:  Smoking cessation education: Provided []   Declined []    [x] Nonsmoker - Quit Date:    38          [] Never a smoker           Cancer Screening:  Colonoscopy [x] Current [] Not current   [] Not current, but scheduled   [] NA  Mammogram [] Current [] Not current   [] Not current, but scheduled   [x] NA  Prostate [] Current [x] Not current   [] Not current, but scheduled   [] NA  PAP/Pelvic [] Current [] Not current   [] Not current, but scheduled   [x] NA  Skin  [] Current  [x] Not current   [] Not current, but scheduled   [] NA    Hormone:  Lupron []   Last dose given:           Next dose due:   Eligard []   Last dose given:           Next dose due:   Aromatase Inhibitors []   Medication name:   N/A:  [x]         FALLS RISK SCREEN  Instructions:  Assess the patient and enter the appropriate indicators that are present for fall risk identification.    Total the numbers entered and assign a fall risk score from Table 2.  Reassess patient at a minimum every 12 weeks or with status change. Assessment   Date  8/30/2019     1. Mental Ability: confusion/cognitively impaired 0     2. Elimination Issues: incontinence, frequency 0       3. Ambulatory: use of assistive devices (walker, cane, off-loading devices),        attached to equipment (IV pole, oxygen) 0     4. Sensory Limitations: dizziness, vertigo, impaired vision 0     5. Age less than 65        0     6. Age 72 or greater 1     7. Medication: diuretics, strong analgesics, hypnotics, sedatives,        antihypertensive agents 0   8. Falls:  recent history of falls within the last 3 months (not to include slipping or        tripping) 0   TOTAL 1    If score of 4 or greater was education given? No           TABLE 2   Risk Score Risk Level Plan of Care   0-3 Little or  No Risk 1. Provide assistance as indicated for ambulation activities  2. Reorient confused/cognitively impaired patient  3. Chair/bed in low position, stretcher/bed with siderails up except when performing patient care activities  5. Educate patient/family/caregiver on falls prevention  6.  Reassess in 12 weeks or with any noted change in patient condition which places them at a risk for a fall   4-6 Moderate Risk 1. Provide assistance as indicated for ambulation activities  2. Reorient confused/cognitively impaired patient  3. Chair/bed in low position, stretcher/bed with siderails up except when performing patient care activities  4. Educate patient/family/caregiver on falls prevention     7 or   Higher High Risk 1. Place patient in easily observable treatment room  2. Patient attended at all times by family member or staff  3. Provide assistance as indicated for ambulation activities  4. Reorient confused/cognitively impaired patient  5. Chair/bed in low position, stretcher/bed with siderails up except when performing patient care activities  6.   Educate

## 2020-03-04 ENCOUNTER — HOSPITAL ENCOUNTER (OUTPATIENT)
Dept: RADIATION ONCOLOGY | Facility: MEDICAL CENTER | Age: 85
Discharge: HOME | End: 2020-03-04
Attending: RADIOLOGY
Payer: MEDICARE

## 2020-03-04 VITALS
WEIGHT: 183.13 LBS | DIASTOLIC BLOOD PRESSURE: 67 MMHG | SYSTOLIC BLOOD PRESSURE: 142 MMHG | OXYGEN SATURATION: 99 % | RESPIRATION RATE: 16 BRPM | HEART RATE: 91 BPM | TEMPERATURE: 98.1 F | BODY MASS INDEX: 25.54 KG/M2

## 2020-03-04 PROCEDURE — 99212 OFFICE O/P EST SF 10 MIN: CPT | Performed by: RADIOLOGY

## 2020-03-04 RX ORDER — PREDNISONE 10 MG/1
12.5 TABLET ORAL PRN
COMMUNITY

## 2020-03-04 NOTE — PROGRESS NOTES
lisinopril-hydrochlorothiazide (PRINZIDE;ZESTORETIC) 20-12.5 MG per tablet, Take 1 tablet by mouth daily, Disp: , Rfl:     REVIEW OF SYSTEMS: I reviewed the complete 12-Point ROS with the patient. Pertinent ones noted in the HPI. PHYSICAL EXAMINATION:  BP (!) 142/67   Pulse 91   Temp 98.1 °F (36.7 °C) (Oral)   Resp 16   Wt 183 lb 2 oz (83.1 kg)   SpO2 99%   BMI 25.54 kg/m²  Pain 0/10, . GENERAL:  Well-appearing, in no apparent distress, alert and fully oriented, answers questions appropriately. HEENT:  Normocephalic, atraumatic, PER, EOMI, on inspection of the oral cavity and visible oropharynx subsites, normal dentition, mucous membranes moist, uvula elevates normally on phonation, no suspicious asymmetry or abnormal mass lesions. NECK:  No cervical or supraclavicular lymphadenopathy. LUNGS:  Clear to auscultation bilaterally. HEART:  Normal rate, regular rhythm, normal S1/S2, Grade 1 murmur best appreciated in the right 2nd intercostal space. ABD:  Normoactive bowel sounds, no visceromegaly or masses on palpation. EXTREMITIES:  Normal range of motion, no cyanosis/clubbing/edema. LYMPH:  No appreciable adenopathy. NEURO: AOx4, CNs II-XII grossly normal, gait normal, muscle power in extremities normal.  PSYCH:  Appropriate affect for the clinical situation. Insight and judgment not impaired. Labs: No recent studies. 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 Small subpleural nodule in the posterior aspect of the right upper lobe noted on September 2019 scan is no longer visible. The right middle lobe bronchus is narrowed, perhaps due to post radiation effects. Faint 0.4 cm noncalcified nodule in the superior segment of the left lower lobe medially remains stable. PATHOLOGY: No recent studies. ASSESSMENT: Complete response to therapy.   PLAN: Patient is doing very well, with no evidence of disease recurrence or progression. Continue consolidative immunotherapy, as per Dr. Jannette Jacob. Return to clinic in 6 months, or sooner if clinically warranted. We reviewed red flag symptoms/signs that would be concerning for disease recurrence or progression, and he knows to notify his oncologic team promptly if these ever occur.       Electronically signed by Demarco Toney MD on 3/4/2020 at 8:17 AM

## 2020-03-04 NOTE — PROGRESS NOTES
assign a fall risk score from Table 2.  Reassess patient at a minimum every 12 weeks or with status change. Assessment   Date  3/4/2020     1. Mental Ability: confusion/cognitively impaired 0     2. Elimination Issues: incontinence, frequency 0       3. Ambulatory: use of assistive devices (walker, cane, off-loading devices),        attached to equipment (IV pole, oxygen) 0     4. Sensory Limitations: dizziness, vertigo, impaired vision 0     5. Age less than 65        0     6. Age 72 or greater 1   7. Medication: diuretics, strong analgesics, hypnotics, sedatives,        antihypertensive agents 0   8. Falls:  recent history of falls within the last 3 months (not to include slipping or        tripping) 0   TOTAL 1    If score of 4 or greater was education given? No           TABLE 2   Risk Score Risk Level Plan of Care   0-3 Little or  No Risk 1. Provide assistance as indicated for ambulation activities  2. Reorient confused/cognitively impaired patient  3. Chair/bed in low position, stretcher/bed with siderails up except when performing patient care activities  5. Educate patient/family/caregiver on falls prevention  6.  Reassess in 12 weeks or with any noted change in patient condition which places them at a risk for a fall   4-6 Moderate Risk 1. Provide assistance as indicated for ambulation activities  2. Reorient confused/cognitively impaired patient  3. Chair/bed in low position, stretcher/bed with siderails up except when performing patient care activities  4. Educate patient/family/caregiver on falls prevention     7 or   Higher High Risk 1. Place patient in easily observable treatment room  2. Patient attended at all times by family member or staff  3. Provide assistance as indicated for ambulation activities  4. Reorient confused/cognitively impaired patient  5. Chair/bed in low position, stretcher/bed with siderails up except when performing patient care activities  6.   Educate patient/family/caregiver on falls prevention         PLAN: Patient is seen today in follow up        Rebecca Parsons

## 2020-09-09 ENCOUNTER — HOSPITAL ENCOUNTER (OUTPATIENT)
Dept: RADIATION ONCOLOGY | Facility: MEDICAL CENTER | Age: 85
Discharge: HOME | End: 2020-09-09
Attending: RADIOLOGY
Payer: MEDICARE

## 2020-09-09 VITALS
TEMPERATURE: 96.2 F | BODY MASS INDEX: 24.02 KG/M2 | SYSTOLIC BLOOD PRESSURE: 146 MMHG | RESPIRATION RATE: 16 BRPM | HEART RATE: 93 BPM | WEIGHT: 172.2 LBS | DIASTOLIC BLOOD PRESSURE: 72 MMHG | OXYGEN SATURATION: 99 %

## 2020-09-09 PROCEDURE — 99212 OFFICE O/P EST SF 10 MIN: CPT | Performed by: RADIOLOGY

## 2020-09-09 NOTE — PROGRESS NOTES
Roberta Linda  9/9/2020  8:10 AM    Pt here for follow up visit. Patient denies any issues with breathing. CT in Dec and next week sees TISHA Morales to [facility].  Vitals:    09/09/20 0805   BP: (!) 146/72   Pulse: 93   Resp: 16   Temp: 96.2 °F (35.7 °C)   SpO2: 99%    :  Patient Currently in Pain: No             Wt Readings from Last 1 Encounters:   09/09/20 172 lb 3.2 oz (78.1 kg)                Current Outpatient Medications:     predniSONE (DELTASONE) 10 MG tablet, Take 12.5 mg by mouth as needed , Disp: , Rfl:     aspirin 81 MG tablet, Take 81 mg by mouth daily, Disp: , Rfl:     atorvastatin (LIPITOR) 20 MG tablet, Take 20 mg by mouth daily, Disp: , Rfl:     lisinopril-hydrochlorothiazide (PRINZIDE;ZESTORETIC) 20-12.5 MG per tablet, Take 1 tablet by mouth daily, Disp: , Rfl:     glimepiride (AMARYL) 4 MG tablet, Take 4 mg by mouth every morning (before breakfast), Disp: , Rfl:     Immunizations:    Influenza status:    [x]   Current   []   Patient declined    Pneumococcal status:  [x]   Current  []   Patient declined    Smoking Status:    [] Smoker - PPD:  Smoking cessation education: Provided []   Declined []    [x] Nonsmoker - Quit [id number]               [] Never a smoker           Cancer Screening:  Colonoscopy [] Current [] Not current   [] Not current, but scheduled   [x] NA  Mammogram [] Current [] Not current   [] Not current, but scheduled   [x] NA  Prostate [] Current [] Not current   [] Not current, but scheduled   [x] NA  PAP/Pelvic [] Current [] Not current   [] Not current, but scheduled   [x] NA  Skin  [] Current  [] Not current   [] Not current, but scheduled   [x] NA    Hormone:  Lupron []   Last dose given:           Next dose due:   Eligard []   Last dose given:           Next dose due:   Aromatase Inhibitors []   Medication name:   N/A:  [x]              *BREAST Patient only:    Lymphedema Eval:   [] left arm      [] right arm  Location:     Measurement (cm)    Upper Bicep :    Lower Bicep : FALLS RISK SCREEN  Instructions:  Assess the patient and enter the appropriate indicators that are present for fall risk identification. Total the numbers entered and assign a fall risk score from Table 2.  Reassess patient at a minimum every 12 weeks or with status change. Assessment   Date  9/9/2020     1. Mental Ability: confusion/cognitively impaired 0     2. Elimination Issues: incontinence, frequency 0       3. Ambulatory: use of assistive devices (walker, cane, off-loading devices),        attached to equipment (IV pole, oxygen) 0     4. Sensory Limitations: dizziness, vertigo, impaired vision 0     5. Age less than 65        0     6. Age 72 or greater 1     7. Medication: diuretics, strong analgesics, hypnotics, sedatives,        antihypertensive agents 1   8. Falls:  recent history of falls within the last 3 months (not to include slipping or        tripping) 0   TOTAL 2    If score of 4 or greater was education given? No           TABLE 2   Risk Score Risk Level Plan of Care   0-3 Little or  No Risk 1. Provide assistance as indicated for ambulation activities  2. Reorient confused/cognitively impaired patient  3. Chair/bed in low position, stretcher/bed with siderails up except when performing patient care activities  5. Educate patient/family/caregiver on falls prevention  6.  Reassess in 12 weeks or with any noted change in patient condition which places them at a risk for a fall   4-6 Moderate Risk 1. Provide assistance as indicated for ambulation activities  2. Reorient confused/cognitively impaired patient  3. Chair/bed in low position, stretcher/bed with siderails up except when performing patient care activities  4. Educate patient/family/caregiver on falls prevention     7 or   Higher High Risk 1. Place patient in easily observable treatment room  2. Patient attended at all times by family member or staff  3.   Provide assistance as indicated for ambulation activities  4. Reorient confused/cognitively impaired patient  5. Chair/bed in low position, stretcher/bed with siderails up except when performing patient care activities  6.   Educate patient/family/caregiver on falls prevention         PLAN: Patient is seen today in follow up        Rebecca Parsons

## 2020-09-09 NOTE — PROGRESS NOTES
[facility] Radiation Oncology  Follow-Up note    Date of Service: 2020    Location: [facility]      Patient ID:   Roberta Linda  : 1935   MRN: [id number]    DIAGNOSIS:   Cancer Staging  Cancer of upper lobe of right lung [facility])  Staging form: Lung, AJCC 8th Edition  - Clinical stage from 2019: Stage IIIA (cT3, cN1, cM0) - Signed by Abelino Gandhi MD on 2019      Chief Complaint: \" Patient has no complaint except for soreness of his muscles of the left thigh he otherwise doing well denied any new pulmonary symptoms no weight loss and doing really well    INTERVAL HISTORY:   Roberta Linda returns in a previously scheduled follow-up visit. Patient was last seen in our clinic . I closely reviewed the medical, surgical, social and family history as per the detailed physician notes from our department. Interim changes as noted above. MEDICATIONS:    Current Outpatient Medications:     predniSONE (DELTASONE) 10 MG tablet, Take 12.5 mg by mouth as needed , Disp: , Rfl:     aspirin 81 MG tablet, Take 81 mg by mouth daily, Disp: , Rfl:     atorvastatin (LIPITOR) 20 MG tablet, Take 20 mg by mouth daily, Disp: , Rfl:     lisinopril-hydrochlorothiazide (PRINZIDE;ZESTORETIC) 20-12.5 MG per tablet, Take 1 tablet by mouth daily, Disp: , Rfl:     glimepiride (AMARYL) 4 MG tablet, Take 4 mg by mouth every morning (before breakfast), Disp: , Rfl:     REVIEW OF SYSTEMS: I reviewed the complete 14-Point ROS with the patient. Pertinent ones noted in the HPI. PHYSICAL EXAMINATION:  BP (!) 146/72   Pulse 93   Temp 96.2 °F (35.7 °C) (Oral)   Resp 16   Wt 172 lb 3.2 oz (78.1 kg)   SpO2 99%   BMI 24.02 kg/m²   Pain 0/10, KPS  GENERAL:  Well-appearing, in no apparent distress, alert and fully oriented, answers questions appropriately.   HEENT:  Normocephalic, atraumatic, PER, EOMI, wears face mask due to COVID19 precautions, no suspicious asymmetry or abnormal mass lesions appreciated. NECK:  No cervical or supraclavicular lymphadenopathy. LUNGS:  Clear to auscultation bilaterally. HEART:  Normal rate, regular rhythm, normal S1/S2, no murmurs/rubs/gallops. ABD:  No visceromegaly or masses. EXTREMITIES:  Normal range of motion, no cyanosis/clubbing/edema. LYMPH:  No appreciable adenopathy. NEURO: AOx4, CNs II-XII grossly normal, gait normal, muscle power in extremities normal.  PSYCH:  Appropriate affect for the clinical situation. Insight and judgment not impaired. Labs:    RADS: Last CT of the chest from 3/4/2020 showed no evidence of recurrence. Patient scheduled to undergo CT of the chest on December and he is being closely monitored by Dr. Joanna Larkin. PATHOLOGY: No recent studies. ASSESSMENT: Stable no evidence of recurrence doing well. PLAN: He was given 6 months follow-up. He is being closely followed up by Dr. Joanna Larkin. Time spent with patient 25 minutes. >50% of time allotted to education, answering questions, and coordinating care.     Electronically signed by Clarice Mcclain MD on 9/9/2020 at 8:32 AM    Patient Care Team:  Germania Suárez MD as PCP - General (Family Medicine)  Layo Paris RN as Nurse Navigator (Oncology)  Isaiah Aggarwal MD as Consulting Physician (Hematology and Oncology)  Petr Anderson MD as Consulting Physician (Radiation Oncology)

## 2021-01-08 ENCOUNTER — TELEPHONE (OUTPATIENT)
Dept: RADIATION ONCOLOGY | Facility: MEDICAL CENTER | Age: 86
End: 2021-01-08

## 2021-04-16 ENCOUNTER — TELEPHONE (OUTPATIENT)
Dept: RADIATION ONCOLOGY | Facility: MEDICAL CENTER | Age: 86
End: 2021-04-16

## 2021-04-16 ENCOUNTER — HOSPITAL ENCOUNTER (OUTPATIENT)
Dept: RADIATION ONCOLOGY | Facility: MEDICAL CENTER | Age: 86
Discharge: HOME | End: 2021-04-16
Attending: RADIOLOGY
Payer: MEDICARE

## 2021-04-16 VITALS
TEMPERATURE: 97.7 F | SYSTOLIC BLOOD PRESSURE: 144 MMHG | WEIGHT: 184.38 LBS | RESPIRATION RATE: 16 BRPM | HEART RATE: 92 BPM | OXYGEN SATURATION: 99 % | BODY MASS INDEX: 25.72 KG/M2 | DIASTOLIC BLOOD PRESSURE: 62 MMHG

## 2021-04-16 PROCEDURE — 99213 OFFICE O/P EST LOW 20 MIN: CPT | Performed by: RADIOLOGY

## 2021-04-16 PROCEDURE — 99212 OFFICE O/P EST SF 10 MIN: CPT | Performed by: RADIOLOGY

## 2021-04-16 NOTE — PROGRESS NOTES
systems was performed and assessed and found to be negative except as noted above. PHYSICAL EXAMINATION:      ECOG:      VITAL SIGNS: BP (!) 144/62   Pulse 92   Temp 97.7 °F (36.5 °C) (Temporal)   Resp 16   Wt 184 lb 6 oz (83.6 kg)   SpO2 99%   BMI 25.72 kg/m²     [unfilled]    Patient looks well in no pain or distress. HEENT examinations are unremarkable. There is no cervical or supraclavicular node enlargement. No axillary node enlargement. Lung exam reveals decreased air entry bilaterally. Abdominal examination reveals no organomegaly or any palpable masses. Lower limbs reveal no edema. No bony tenderness anywhere. LABS:  WBC   Date Value Ref Range Status   08/22/2019 6.88 10^3/mL      Hemoglobin   Date Value Ref Range Status   08/22/2019 13.2 (L) 13.5 - 17.5 g/dL      Platelets   Date Value Ref Range Status   08/22/2019 239 K/µL      No results found for: , CEA  No results found for: PSA    IMAGING:         ASSESSMENT, Doing well no evidence of recurrence  Zoe Shine is a 82 y.o. male with a Cancer Staging  Cancer of upper lobe of right lung ([facility] [address]  Staging form: Lung, AJCC 8th Edition  - Clinical stage from 1/29/2019: Stage IIIA (cT3, cN1, cM0) - Signed by Te Bojorquez MD on 2/21/2019  . Plan patient was advised to be seen by Dr. Jennifer Dahl I gave him no follow-up appointment. I will be happy to see him in the future if he develop problems requiring radiation treatment. Electronically signed by Darren Bender MD on 4/16/2021 at 8:51 AM        Medications Prescribed:   New Prescriptions    No medications on file       Orders: No orders of the defined types were placed in this encounter.       CC:  Patient Care Team:  Antonio Li MD as PCP - General (Family Medicine)  Casey Reina RN as Nurse Navigator (Oncology)  Martha Jimenez MD as Consulting Physician (Hematology and Oncology)  Dana Mdcermott MD as Consulting Physician (Radiation Oncology)

## 2021-04-16 NOTE — PROGRESS NOTES
Sonam Salcedo  4/16/2021  8:51 AM    PT here for follow up. 12/2020 last CT scan at [facility] [place], [place], requesting images. Dr. David Farley to [facility]. NO follow up needed at this time.   Vitals:    04/16/21 0849   BP: (!) 144/62   Pulse: 92   Resp: 16   Temp: 97.7 °F (36.5 °C)   SpO2: 99%    :  Patient Currently in Pain: Yes  Pain Assessment: 0-10          Wt Readings from Last 1 Encounters:   04/16/21 184 lb 6 oz (83.6 kg)                Current Outpatient Medications:     predniSONE (DELTASONE) 10 MG tablet, Take 12.5 mg by mouth as needed , Disp: , Rfl:     aspirin 81 MG tablet, Take 81 mg by mouth daily, Disp: , Rfl:     atorvastatin (LIPITOR) 20 MG tablet, Take 20 mg by mouth daily, Disp: , Rfl:     glimepiride (AMARYL) 4 MG tablet, Take 4 mg by mouth every morning (before breakfast), Disp: , Rfl:     lisinopril-hydrochlorothiazide (PRINZIDE;ZESTORETIC) 20-12.5 MG per tablet, Take 1 tablet by mouth daily, Disp: , Rfl:     Immunizations:    Influenza status:    [x]   Current   []   Patient declined    Pneumococcal status:  [x]   Current  []   Patient declined  Covid status:   [x]  Dose #1:                     [x]  Dose #2:               []   Patient declined    Smoking Status:    [] Smoker - PPD:   [x] Nonsmoker - Quit Date:   38            [] Never a smoker      Cancer Screening:  Colonoscopy   [] Current       [] Not current   [] Not current, but scheduled   [x] NA  Mammogram   [] Current       [] Not current   [] Not current, but scheduled   [x] NA  Prostate           [] Current       [] Not current   [] Not current, but scheduled   [x] NA  PAP/Pelvic      [] Current       [] Not current   [] Not current, but scheduled   [x] NA  Skin                 [] Current       [] Not current   [] Not current, but scheduled   [x] NA     Hormone:  Lupron []   Last dose given:           Next dose due:   Eligard []   Last dose given:           Next dose due:   Aromatase Inhibitors []   Medication name:   N/A:  [x]             *BREAST Patient only:    Lymphedema Eval:   [] left arm      [] right arm  Location:     Measurement (cm)    Upper Bicep :    Lower Bicep :         FALLS RISK SCREEN  Instructions:  Assess the patient and enter the appropriate indicators that are present for fall risk identification. Total the numbers entered and assign a fall risk score from Table 2.  Reassess patient at a minimum every 12 weeks or with status change. Assessment   Date  4/16/2021     1. Mental Ability: confusion/cognitively impaired 0     2. Elimination Issues: incontinence, frequency 0       3. Ambulatory: use of assistive devices (walker, cane, off-loading devices),        attached to equipment (IV pole, oxygen) 0     4. Sensory Limitations: dizziness, vertigo, impaired vision 0     5. Age less than 65        0     6. Age 72 or greater 1     7. Medication: diuretics, strong analgesics, hypnotics, sedatives,        antihypertensive agents 0   8. Falls:  recent history of falls within the last 3 months (not to include slipping or        tripping) 0   TOTAL 1    If score of 4 or greater was education given? No           TABLE 2   Risk Score Risk Level Plan of Care   0-3 Little or  No Risk 1. Provide assistance as indicated for ambulation activities  2. Reorient confused/cognitively impaired patient  3. Chair/bed in low position, stretcher/bed with siderails up except when performing patient care activities  5. Educate patient/family/caregiver on falls prevention  6.  Reassess in 12 weeks or with any noted change in patient condition which places them at a risk for a fall   4-6 Moderate Risk 1. Provide assistance as indicated for ambulation activities  2. Reorient confused/cognitively impaired patient  3. Chair/bed in low position, stretcher/bed with siderails up except when performing patient care activities  4. Educate patient/family/caregiver on falls prevention     7 or   Higher High Risk 1.   Place patient in easily observable treatment room  2. Patient attended at all times by family member or staff  3. Provide assistance as indicated for ambulation activities  4. Reorient confused/cognitively impaired patient  5. Chair/bed in low position, stretcher/bed with siderails up except when performing patient care activities  6.   Educate patient/family/caregiver on falls prevention         PLAN: Patient is seen today in follow up        Rebecca Parsons